# Patient Record
Sex: MALE | Race: AMERICAN INDIAN OR ALASKA NATIVE | Employment: UNEMPLOYED | URBAN - METROPOLITAN AREA
[De-identification: names, ages, dates, MRNs, and addresses within clinical notes are randomized per-mention and may not be internally consistent; named-entity substitution may affect disease eponyms.]

---

## 2018-08-30 ENCOUNTER — HOSPITAL ENCOUNTER (EMERGENCY)
Facility: CLINIC | Age: 28
Discharge: HOME | End: 2018-08-30
Attending: FAMILY MEDICINE | Admitting: FAMILY MEDICINE
Payer: COMMERCIAL

## 2018-08-30 VITALS
TEMPERATURE: 97.6 F | WEIGHT: 153 LBS | RESPIRATION RATE: 16 BRPM | DIASTOLIC BLOOD PRESSURE: 77 MMHG | OXYGEN SATURATION: 98 % | SYSTOLIC BLOOD PRESSURE: 125 MMHG

## 2018-08-30 DIAGNOSIS — K04.7 DENTAL ABSCESS: ICD-10-CM

## 2018-08-30 PROCEDURE — 99283 EMERGENCY DEPT VISIT LOW MDM: CPT

## 2018-08-30 PROCEDURE — 99284 EMERGENCY DEPT VISIT MOD MDM: CPT | Mod: Z6 | Performed by: FAMILY MEDICINE

## 2018-08-30 RX ORDER — BUPIVACAINE HYDROCHLORIDE 5 MG/ML
30 INJECTION, SOLUTION EPIDURAL; INTRACAUDAL ONCE
Status: DISCONTINUED | OUTPATIENT
Start: 2018-08-30 | End: 2018-08-30 | Stop reason: HOSPADM

## 2018-08-30 RX ORDER — PENICILLIN V POTASSIUM 500 MG/1
500 TABLET, FILM COATED ORAL 4 TIMES DAILY
Qty: 28 TABLET | Refills: 0 | Status: SHIPPED | OUTPATIENT
Start: 2018-08-30 | End: 2018-09-01

## 2018-08-30 RX ORDER — IBUPROFEN 800 MG/1
800 TABLET, FILM COATED ORAL EVERY 8 HOURS PRN
Qty: 20 TABLET | Refills: 0 | Status: SHIPPED | OUTPATIENT
Start: 2018-08-30 | End: 2018-09-01

## 2018-08-30 NOTE — ED PROVIDER NOTES
History     Chief Complaint   Patient presents with     Dental Pain     Has two broken teeth, right lower molars. One tooth down to the gum. Had bitten a sucker one week ago. Pain worse last two days.      HPI  Néstor Mckeon is a 27 year old male who presents to the Emergency Department for the evaluation of lower right dental pain. Patient states approximately one week ago he bit into something hard and broke a couple of his teeth (#29 and #30) and has had worsening pain since with increased sensitivity when eating and with hot and cold temperatures. Patient states he is otherwise healthy. He denies having an established dentist. He is requesting a dental block for pain.     I have reviewed the Medications, Allergies, Past Medical and Surgical History, and Social History in the [employer] system.  History reviewed. No pertinent past medical history.    History reviewed. No pertinent surgical history.    No family history on file.    Social History   Substance Use Topics     Smoking status: Not on file     Smokeless tobacco: Not on file     Alcohol use Not on file       Current Facility-Administered Medications   Medication     BUPivacaine (MARCAINE)  0.5% 30ml vial     Current Outpatient Prescriptions   Medication     ibuprofen (ADVIL/MOTRIN) 800 MG tablet     penicillin V potassium (VEETID) 500 MG tablet      No Known Allergies    Review of Systems  ROS: 14 point ROS neg other than the symptoms noted above in the HPI.    Physical Exam   BP: 126/87  Heart Rate: 73  Temp: 97.6  F (36.4  C)  Resp: 16  Weight: 69.4 kg (153 lb)  SpO2: 98 %      Physical Exam   Constitutional: He is oriented to person, place, and time. He appears well-developed and well-nourished.   HENT:   Head: Normocephalic and atraumatic.   Mouth/Throat: Uvula is midline and oropharynx is clear and moist. No trismus in the jaw. Abnormal dentition.   Patient has multiple carious appearing teeth and several teeth including 28 through 30 which are broken  off near the gum line.  No gum swelling or definite abscess, however he is very tender   Eyes: EOM are normal. Pupils are equal, round, and reactive to light.   Neck: Normal range of motion. Neck supple. No tracheal deviation present. No thyromegaly present.   Cardiovascular: Normal rate, regular rhythm, normal heart sounds and intact distal pulses.  Exam reveals no gallop and no friction rub.    No murmur heard.  Pulmonary/Chest: Effort normal and breath sounds normal. He exhibits no tenderness.   Abdominal: Soft. Bowel sounds are normal. He exhibits no distension and no mass. There is no tenderness.   Musculoskeletal: He exhibits no edema or tenderness.   The patient has obvious needle tracks in both biceps areas without any signs of abscess or cellulitis consistent with IV drug abuse   Neurological: He is alert and oriented to person, place, and time. No cranial nerve deficit. Coordination normal.   Skin: Skin is warm and dry. No rash noted.   Psychiatric: He has a normal mood and affect. His behavior is normal.   Nursing note and vitals reviewed.      ED Course   9:47 AM  The patient was seen and examined by Jeb Cardenas MD in Room 7.     ED Course     Procedures             Critical Care time:  none             Labs Ordered and Resulted from Time of ED Arrival Up to the Time of Departure from the ED - No data to display         Assessments & Plan (with Medical Decision Making)   Patient presented with right lower dental pain.  Initial DDX;  Dental caries, tooth fracture, periodontitis, there is no evidence of dental abscess, ludwigs angina or neck deep space infection.  Plan:  Dental block, antibiotics, pain control, close follow up with dentistry.  Patient was given a list of community dental resources.  Patient underwent a comprehensive evaluation in the ED which did not reveal any immediate life threats.  Patient improved with treatment offered in the emergency department.  Due to this improvement they were  discharged home.  They were instructed on close follow up with Dentistry ASAP and to return  to the ED if symptoms persisted, worsened or if they developed any new concerning symptoms or had any other concerns.  They verbalized understanding of the plan of care and felt comfortable with the plan.  In addition, we discussed the needle tracks on his arms.  Ultimately he admitted that he uses IV drugs including methamphetamine.  He is not interested in detox or treatment at this time, however I did provide him with resources for a rule 25 evaluation and also for [place] recovery services.    I have reviewed the nursing notes.    I have reviewed the findings, diagnosis, plan and need for follow up with the patient.    New Prescriptions    IBUPROFEN (ADVIL/MOTRIN) 800 MG TABLET    Take 1 tablet (800 mg) by mouth every 8 hours as needed for moderate pain    PENICILLIN V POTASSIUM (VEETID) 500 MG TABLET    Take 1 tablet (500 mg) by mouth 4 times daily for 7 days       Final diagnoses:   Dental abscess     IAbram, am serving as a trained medical scribe to document services personally performed by Jeb Cardenas MD, based on the provider's statements to me.   IJeb MD, was physically present and have reviewed and verified the accuracy of this note documented by Abram Villela.    8/30/2018   [facility], EMERGENCY DEPARTMENT     Jin Cardenas MD  08/30/18 5230

## 2018-08-30 NOTE — DISCHARGE INSTRUCTIONS
Thank you for choosing [facility].     Please closely monitor for further symptoms. Return to the Emergency Department if you develop any new or worsening signs or symptoms.    If you received any opiate pain medications or sedatives during your visit, please do not drive for at least 8 hours.     Labs, cultures or final xray interpretations may still need to be reviewed.  We will call you if your plan of care needs to be changed.    Please make an appointment to follow up with [facility], [phone] or [facility] (phone: [phone]) as soon as possible.  Should you have difficulty accessing care due to insurance concerns, please see list below.  Many of these clinics offer a sliding fee option for patients that qualify, and see patients on a walk-in or same day basis. Please call each clinic directly. As services, hours, fees and policies vary greatly.    [place]:  [facility]     [phone]        [facility] ([facility]) [phone]  [facility]  [phone]  [facility]      [phone]   Community [facility]    [phone]  [facility]  [phone]  [facility]) [phone]  [facility]     [phone]  [facility]   [phone]  [facility] (cash only)   [phone]  [facility]    [phone] (adults)          [phone] (children)    [place]:  [facility]     [phone]; [phone]  [facility]     [phone]  [facility]     [phone]  [facility] (free, limited)    [phone]    Multiple Locations:  [facility]       [phone]        Dental Abscess    An abscess is a pocket of pus at the tip of a tooth root in your jaw bone. It is caused  "by an infection at the root of the tooth. It can cause pain and swelling of the gum, cheek, or jaw. Pain may spread from the tooth to your ear or the area of your jaw on the same side. If the abscess isn t treated, it appears as a bubble or swelling on the gum near the tooth. The pressure that builds in this swelling is the source of the pain. More serious infections cause your face to swell.  An abscess can be caused by a crack in the tooth, a cavity, a gum infection, or a combination of these. Once the pulp of the tooth is exposed, bacteria can spread down the roots to the tip. If the bacteria are not stopped, they can damage the bone and soft tissue, and an abscess can form.  Home care  Follow these guidelines when caring for yourself at home:    Don't have hot and cold foods and drinks. Your tooth may be sensitive to changes in temperature. Don t chew on the side of the infected tooth.    If your tooth is chipped or cracked, or if there is a large open cavity, put oil of cloves directly on the tooth to relieve pain. You can buy oil of cloves at [facility]. Some pharmacies carry an over-the-counter \"toothache kit.\" This contains a paste that you can put on the exposed tooth to make it less sensitive.    Put a cold pack on your jaw over the sore area to help reduce pain.    You may use over-the-counter medicine to ease pain, unless another medicine was prescribed. If you have chronic liver or kidney disease, talk with your healthcare provider before using acetaminophen or ibuprofen. Also talk with your provider if you ve had a stomach ulcer or GI bleeding.    An antibiotic will be prescribed. Take it until finished, even if you are feeling better after a few days.  Follow-up care  Follow up with your dentist or an oral surgeon, or as advised. Once an infection occurs in a tooth, it will continue to be a problem until the infection is drained. This is done through surgery or a root canal. Or you may need to have " your tooth pulled.  Call 911  Call 911 if any of these occur:    Unusual drowsiness    Headache or stiff neck    Weakness or fainting    Difficulty swallowing, breathing, or opening your mouth    Swollen eyelids  When to seek medical advice  Call your healthcare provider right away if any of these occur:    Your face becomes more swollen or red    Pain gets worse or spreads to your neck    Fever of 100.4  F (38.0  C) or higher, or as directed by your healthcare provider    Pus drains from the tooth  Date Last Reviewed: 10/1/2016    6534-9931 [employer]. [address] [place] [address]. All rights reserved. This information is not intended as a substitute for professional medical care. Always follow your healthcare professional's instructions.        If you desire chemical dependency assessment or counseling, follow up with [facility]: [phone]

## 2018-08-30 NOTE — ED AVS SNAPSHOT
[facility], [place], Emergency Department    [address]    [place] [address]    Phone:  [phone]    Fax:  [phone]                                       Néstor Mckeon   MRN: [id number]    Department:  [facility], Emergency Department   Date of Visit:  8/30/2018           After Visit Summary Signature Page     I have received my discharge instructions, and my questions have been answered. I have discussed any challenges I see with this plan with the nurse or doctor.    ..........................................................................................................................................  Patient/Patient Representative Signature      ..........................................................................................................................................  Patient Representative Print Name and Relationship to Patient    ..................................................               ................................................  Date                                            Time    ..........................................................................................................................................  Reviewed by Signature/Title    ...................................................              ..............................................  Date                                                            Time          22EPIC Rev 08/18

## 2018-08-30 NOTE — ED AVS SNAPSHOT
[facility], [place], Emergency Department    [address]    [place] [address]    Phone:  [phone]    Fax:  [phone]                                       Néstor Mckeon   MRN: [id number]    Department:  [facility], [place], Emergency Department   Date of Visit:  8/30/2018           Patient Information     Date Of Birth          1990        Your diagnoses for this visit were:     Dental abscess        You were seen by Jin Cardenas MD.        Discharge Instructions       Thank you for choosing [facility].     Please closely monitor for further symptoms. Return to the Emergency Department if you develop any new or worsening signs or symptoms.    If you received any opiate pain medications or sedatives during your visit, please do not drive for at least 8 hours.     Labs, cultures or final xray interpretations may still need to be reviewed.  We will call you if your plan of care needs to be changed.    Please make an appointment to follow up with [facility], [phone] or [facility] (phone: [phone]) as soon as possible.  Should you have difficulty accessing care due to insurance concerns, please see list below.  Many of these clinics offer a sliding fee option for patients that qualify, and see patients on a walk-in or same day basis. Please call each clinic directly. As services, hours, fees and policies vary greatly.    [place]:  [facility]     [phone]        [facility] ([facility]) [phone]  [facility]  [phone]  [facility]      [phone]   Community Clinic    [phone]  [facility]  [phone]  [facility]) [phone]  [facility]     [phone]  [facility]   [phone]  [facility]  "only)   [phone]  [facility]    [phone] (adults)          [phone] (children)    [place]:  [facility]     [phone]; [phone]  [facility]     [phone]  [facility]     [phone]  [facility] (free, limited)    [phone]    Multiple Locations:  [facility]       [phone]        Dental Abscess    An abscess is a pocket of pus at the tip of a tooth root in your jaw bone. It is caused by an infection at the root of the tooth. It can cause pain and swelling of the gum, cheek, or jaw. Pain may spread from the tooth to your ear or the area of your jaw on the same side. If the abscess isn t treated, it appears as a bubble or swelling on the gum near the tooth. The pressure that builds in this swelling is the source of the pain. More serious infections cause your face to swell.  An abscess can be caused by a crack in the tooth, a cavity, a gum infection, or a combination of these. Once the pulp of the tooth is exposed, bacteria can spread down the roots to the tip. If the bacteria are not stopped, they can damage the bone and soft tissue, and an abscess can form.  Home care  Follow these guidelines when caring for yourself at home:    Don't have hot and cold foods and drinks. Your tooth may be sensitive to changes in temperature. Don t chew on the side of the infected tooth.    If your tooth is chipped or cracked, or if there is a large open cavity, put oil of cloves directly on the tooth to relieve pain. You can buy oil of cloves at drugstores. Some pharmacies carry an over-the-counter \"toothache kit.\" This contains a paste that you can put on the exposed tooth to make it less sensitive.    Put a cold pack on your jaw over the sore area to help reduce pain.    You may use over-the-counter medicine to ease pain, unless another medicine was prescribed. If you have chronic liver or kidney disease, talk with your " healthcare provider before using acetaminophen or ibuprofen. Also talk with your provider if you ve had a stomach ulcer or GI bleeding.    An antibiotic will be prescribed. Take it until finished, even if you are feeling better after a few days.  Follow-up care  Follow up with your dentist or an oral surgeon, or as advised. Once an infection occurs in a tooth, it will continue to be a problem until the infection is drained. This is done through surgery or a root canal. Or you may need to have your tooth pulled.  Call 911  Call 911 if any of these occur:    Unusual drowsiness    Headache or stiff neck    Weakness or fainting    Difficulty swallowing, breathing, or opening your mouth    Swollen eyelids  When to seek medical advice  Call your healthcare provider right away if any of these occur:    Your face becomes more swollen or red    Pain gets worse or spreads to your neck    Fever of 100.4  F (38.0  C) or higher, or as directed by your healthcare provider    Pus drains from the tooth  Date Last Reviewed: 10/1/2016    5035-1122 [employer]. [address] [place] [address]. All rights reserved. This information is not intended as a substitute for professional medical care. Always follow your healthcare professional's instructions.        If you desire chemical dependency assessment or counseling, follow up with [facility]: [phone]      24 Hour Appointment Hotline       To make an appointment at any [facility] clinic, call [phone]). If you don't have a family doctor or clinic, we will help you find one. [facility] clinics are conveniently located to serve the needs of you and your family.             Review of your medicines      START taking        Dose / Directions Last dose taken    ibuprofen 800 MG tablet   Commonly known as:  ADVIL/MOTRIN   Dose:  800 mg   Quantity:  20 tablet        Take 1 tablet (800 mg) by mouth every 8 hours as needed for  "moderate pain   Refills:  0        penicillin V potassium 500 MG tablet   Commonly known as:  VEETID   Dose:  500 mg   Quantity:  28 tablet        Take 1 tablet (500 mg) by mouth 4 times daily for 7 days   Refills:  0                Prescriptions were sent or printed at these locations (2 Prescriptions)                   Other Prescriptions                Printed at Department/Unit printer (2 of 2)         ibuprofen (ADVIL/MOTRIN) 800 MG tablet               penicillin V potassium (VEETID) 500 MG tablet                Orders Needing Specimen Collection     None      Pending Results     No orders found from 8/28/2018 to 8/31/2018.            Pending Culture Results     No orders found from 8/28/2018 to 8/31/2018.            Pending Results Instructions     If you had any lab results that were not finalized at the time of your Discharge, you can call the ED Lab Result RN at [phone]. You will be contacted by this team for any positive Lab results or changes in treatment. The nurses are available 7 days a week from 10A to 6:30P.  You can leave a message 24 hours per day and they will return your call.        Thank you for choosing [facility]       Thank you for choosing [facility] for your care. Our goal is always to provide you with excellent care. Hearing back from our patients is one way we can continue to improve our services. Please take a few minutes to complete the written survey that you may receive in the mail after you visit with us. Thank you!        [employer] Information     [employer] lets you send messages to your doctor, view your test results, renew your prescriptions, schedule appointments and more. To sign up, go to [email or web address] . Click on \"Log in\" on the left side of the screen, which will take you to the Welcome page. Then click on \"Sign up Now\" on the right side of the page.     You will be asked to enter the access code listed below, as well as some personal information. Please follow the " directions to create your username and password.     Your access code is: [email or web address]  Expires: 2018 10:02 AM     Your access code will  in 90 days. If you need help or a new code, please call your [facility] clinic or [phone].        Care EveryWhere ID     This is your Care EveryWhere ID. This could be used by other organizations to access your [facility] medical records  [id number]        Equal Access to Services     HEATHER BEST : Hadii elizabeth mckenzie hadasho Soomaali, waaxda luqadaha, qaybta kaalmada adeegyada, waxmartha worthington . So [facility] [phone].    ATENCIÓN: Si habla español, tiene a harris disposición servicios gratuitos de asistencia lingüística. Llame al [phone].    We comply with applicable federal civil rights laws and [place] laws. We do not discriminate on the basis of race, color, national origin, age, disability, sex, sexual orientation, or gender identity.            After Visit Summary       This is your record. Keep this with you and show to your community pharmacist(s) and doctor(s) at your next visit.

## 2018-09-01 ENCOUNTER — HOSPITAL ENCOUNTER (EMERGENCY)
Facility: CLINIC | Age: 28
Discharge: HOME | End: 2018-09-01
Attending: EMERGENCY MEDICINE | Admitting: EMERGENCY MEDICINE
Payer: COMMERCIAL

## 2018-09-01 VITALS
SYSTOLIC BLOOD PRESSURE: 108 MMHG | RESPIRATION RATE: 16 BRPM | TEMPERATURE: 98.9 F | DIASTOLIC BLOOD PRESSURE: 67 MMHG | OXYGEN SATURATION: 100 %

## 2018-09-01 DIAGNOSIS — M62.81 GENERALIZED MUSCLE WEAKNESS: ICD-10-CM

## 2018-09-01 PROCEDURE — 25000132 ZZH RX MED GY IP 250 OP 250 PS 637: Performed by: EMERGENCY MEDICINE

## 2018-09-01 PROCEDURE — 25000125 ZZHC RX 250: Performed by: EMERGENCY MEDICINE

## 2018-09-01 PROCEDURE — 99283 EMERGENCY DEPT VISIT LOW MDM: CPT

## 2018-09-01 RX ORDER — IBUPROFEN 600 MG/1
600 TABLET, FILM COATED ORAL ONCE
Status: COMPLETED | OUTPATIENT
Start: 2018-09-01 | End: 2018-09-01

## 2018-09-01 RX ORDER — ACETAMINOPHEN 500 MG
500 TABLET ORAL ONCE
Status: COMPLETED | OUTPATIENT
Start: 2018-09-01 | End: 2018-09-01

## 2018-09-01 RX ORDER — ONDANSETRON 4 MG/1
4 TABLET, ORALLY DISINTEGRATING ORAL ONCE
Status: COMPLETED | OUTPATIENT
Start: 2018-09-01 | End: 2018-09-01

## 2018-09-01 RX ADMIN — ONDANSETRON 4 MG: 4 TABLET, ORALLY DISINTEGRATING ORAL at 13:12

## 2018-09-01 RX ADMIN — IBUPROFEN 600 MG: 600 TABLET, FILM COATED ORAL at 13:12

## 2018-09-01 RX ADMIN — ACETAMINOPHEN 500 MG: 500 TABLET, FILM COATED ORAL at 13:12

## 2018-09-01 ASSESSMENT — ENCOUNTER SYMPTOMS
NAUSEA: 1
WEAKNESS: 1
HEADACHES: 1
NUMBNESS: 0
VOMITING: 0
FATIGUE: 1
EYES NEGATIVE: 1
ABDOMINAL PAIN: 0

## 2018-09-01 NOTE — DISCHARGE INSTRUCTIONS
Weakness with Uncertain Cause  Based on your exam today, the exact cause of your weakness is not certain. But your weakness does not seem to be a sign of a serious illness at this time. Keep an eye on your symptoms and get medical advice as instructed below.  Home care    Rest at home today. Don't over-exert yourself.    Take any medicine as prescribed.    For the next few days, drink extra fluids (unless your healthcare provider wants you to restrict fluids for other reasons). Don't skip meals.    Unless otherwise directed, continue to take any prescription medicines.    Contact your healthcare provider if you have any questions or concerns.  Follow-up care  Follow up with your healthcare provider, or as advised.  When to seek medical advice  Call your healthcare provider right away for any of the following:    Symptoms get worse    Symptoms don't start getting better within 2 days    Fever of 100.4  F (38  C) or higher, or as directed by your healthcare provider  Call 911  Call 911 for any of these:    Chest, arm, neck, jaw, or upper back pain    Trouble breathing    Numbness or weakness of the face, one arm, or one leg    Slurred speech, confusion, or trouble speaking, walking, or seeing    Blood in vomit or stool (black or red color)    Loss of consciousness    Severe headache  Date Last Reviewed: 10/1/2017    8747-9965 [employer]. [address] [place] [address], [place] [address]. All rights reserved. This information is not intended as a substitute for professional medical care. Always follow your healthcare professional's instructions.

## 2018-09-01 NOTE — ED PROVIDER NOTES
History     Chief Complaint:  generalized weakness     HPI   Poor historian.  Néstor Mckeon is a 27 year old male who presents via EMS for evaluation of generalized weakness. The patient uses heroin frequently, with last use of IV heroin yesterday. Today he was apprehended by Police after stealing at [employer]. He complained of generalized weakness, diffuse headache, and nausea with perhaps 3 episodes of non-bloody emesis to them, and so was transported here. No focal numbness or weakness, visual deficit, abdominal pain, chest pain, dyspnea, suicidal or homicidal ideation, or any other acute symptoms.       Allergies:  No Known Allergies     Medications:    The patient is not currently taking any prescribed medications.      Past Medical History:    Heroin abuse    Past Surgical History:    History reviewed. No pertinent surgical history.     Family History:    History reviewed. No pertinent family history.      Social History:  Uses IV heroin  Marital Status:  Single [1]       Review of Systems   Constitutional: Positive for fatigue.   Eyes: Negative.    Gastrointestinal: Positive for nausea. Negative for abdominal pain and vomiting.   Neurological: Positive for weakness and headaches. Negative for numbness.   All other systems reviewed and are negative.      Physical Exam     Patient Vitals for the past 24 hrs:   BP Temp Temp src Heart Rate Resp SpO2   09/01/18 1302 108/67 98.9  F (37.2  C) Oral 68 16 100 %        Physical Exam  Vitals: reviewed by me  General: Pt seen on [facility], cooperative, and alert to conversation  Eyes: Tracking well, clear conjunctiva BL  ENT: MMM, midline trachea.   Lungs:   No tachypnea, no accessory muscle use. No respiratory distress.   CV: Rate as above, regular rhythm.    Abd: Soft, non tender, no guarding, no rebound. Non distended  MSK: no peripheral edema or joint effusion.  No evidence of trauma  Skin: No rash, normal turgor and temperature  Neuro: Clear  speech and no facial droop.  Psych: Not RIS, no e/o AH/VH      Emergency Department Course   Interventions:  ibuprofen 600mg, PO  acetaminophen 500mg, PO  ondansetron (Zofran) 4mg, ODT     Emergency Department Course:  Past medical records, nursing notes, and vitals reviewed.   1305: I performed an exam of the patient as documented above.    The patient was given the above interventions with improvement.  The patient tolerated PO here without difficulty.      Impression & Plan    Medical Decision Making:  Néstor Mckeon is a 27 year old male who presents the emergency room after being arrested for shoplifting at a local [employer] store.  Patient states that he felt quite fatigued and nauseous, but also states he is hungry.  He has a benign abdominal exam, he has tolerated an entire tray of food and passed his p.o. trial here.  On my reassessment, he states he has no pain after eating, and is asking to go home.  Patient denies fevers, I see no evidence of illness related to his IV drug abuse, he states that he does plan on using heroin again tonight, he does not want counseling or rehab.  OK for dc as above, discussed clear return to ED precautions.      Diagnosis:    ICD-10-CM    1. Generalized muscle weakness M62.81     resolved       Disposition:   discharged to home    Scribe Disclosure:  I, [place], am serving as a scribe at 1:03 PM on 9/1/2018 to document services personally performed by Toni Dudley MD based on my observations and the provider's statements to me.    [place]  9/1/2018   EMERGENCY DEPARTMENT      Toni Dudley MD  09/01/18 1291

## 2018-09-01 NOTE — ED NOTES
Bed: ED26  Expected date:   Expected time:   Means of arrival:   Comments:  M[address] Lethargic 27 male

## 2019-06-18 ENCOUNTER — HOSPITAL ENCOUNTER (EMERGENCY)
Facility: CLINIC | Age: 29
Discharge: HOME | End: 2019-06-18
Attending: EMERGENCY MEDICINE | Admitting: EMERGENCY MEDICINE
Payer: COMMERCIAL

## 2019-06-18 VITALS
RESPIRATION RATE: 16 BRPM | TEMPERATURE: 97.7 F | OXYGEN SATURATION: 97 % | DIASTOLIC BLOOD PRESSURE: 76 MMHG | HEART RATE: 88 BPM | WEIGHT: 163.6 LBS | SYSTOLIC BLOOD PRESSURE: 118 MMHG

## 2019-06-18 DIAGNOSIS — H92.02 LEFT EAR PAIN: ICD-10-CM

## 2019-06-18 DIAGNOSIS — G44.209 TENSION HEADACHE: ICD-10-CM

## 2019-06-18 PROCEDURE — 99282 EMERGENCY DEPT VISIT SF MDM: CPT | Performed by: EMERGENCY MEDICINE

## 2019-06-18 PROCEDURE — 99284 EMERGENCY DEPT VISIT MOD MDM: CPT | Mod: Z6 | Performed by: EMERGENCY MEDICINE

## 2019-06-18 RX ORDER — IBUPROFEN 600 MG/1
600 TABLET, FILM COATED ORAL ONCE
Status: DISCONTINUED | OUTPATIENT
Start: 2019-06-18 | End: 2019-06-18 | Stop reason: HOSPADM

## 2019-06-18 RX ORDER — ACETAMINOPHEN 500 MG
1000 TABLET ORAL ONCE
Status: DISCONTINUED | OUTPATIENT
Start: 2019-06-18 | End: 2019-06-18 | Stop reason: HOSPADM

## 2019-06-18 RX ORDER — IBUPROFEN 200 MG
400 TABLET ORAL EVERY 8 HOURS PRN
Qty: 60 TABLET | Refills: 0 | Status: SHIPPED | OUTPATIENT
Start: 2019-06-18

## 2019-06-18 RX ORDER — ACETAMINOPHEN 325 MG/1
325-650 TABLET ORAL EVERY 6 HOURS PRN
Qty: 30 TABLET | Refills: 0 | Status: SHIPPED | OUTPATIENT
Start: 2019-06-18

## 2019-06-18 ASSESSMENT — ENCOUNTER SYMPTOMS
TROUBLE SWALLOWING: 0
HEADACHES: 1
NUMBNESS: 0
NECK STIFFNESS: 0
SEIZURES: 0
SINUS PAIN: 0
NECK PAIN: 0
FEVER: 0
SINUS PRESSURE: 0
CHILLS: 0
BACK PAIN: 0

## 2019-06-18 NOTE — ED PROVIDER NOTES
History     Chief Complaint   Patient presents with     Headache     Headache for 2 days, associated with intermittent blurry vision      HPI  Néstor Mckeon is a 28 year old male who has a history of polysubstance abuse presenting with ear pain.  He says he has had ear pain on and off for several years that gets worse in the cold weather.  Denies loss of hearing, change in vision, fevers, chills, neck pain or stiffness.  May be associated with chewing.  He also has headaches, today this is typical headache that have also been for several years but may be worse over the past 2 days.  Headache is gradual onset, not sudden onset.  Says headache is gone away now.  The blurry vision he complained about was he noticed several months ago that when he walks from a light outside environment into the dark room his vision needs time to adjust.  Denies symptoms at this point.  Otherwise has not had nausea, vomiting, diarrhea.  Did use meth yesterday, denies active suicidal homicidal ideation.  Does not have primary care provider.    I have reviewed the Medications, Allergies, Past Medical and Surgical History, and Social History in the Epic system.    Review of Systems   Constitutional: Negative for chills and fever.   HENT: Positive for ear pain (L, several years on and off). Negative for sinus pressure, sinus pain and trouble swallowing.    Musculoskeletal: Negative for back pain, neck pain and neck stiffness.   Skin: Negative for rash.   Neurological: Positive for headaches (none now, similar to prior). Negative for seizures, syncope and numbness.   Psychiatric/Behavioral: Negative for self-injury and suicidal ideas.       Physical Exam   BP: 128/88  Heart Rate: 85  Temp: 97.7  F (36.5  C)  Resp: 16  Weight: 74.2 kg (163 lb 9.6 oz)  SpO2: 99 %      Physical Exam  Physical Exam   Constitutional: oriented to person, place, and time. appears well-developed and well-nourished.   HENT:   Head: Normocephalic and atraumatic.  Bilateral tympanic membranes without evidence of swelling, erythema, bulging.  No tenderness palpation of the temple area.  No tenderness palpation over the TMJ.  Neck: Normal range of motion. Supple neck.  Pulmonary/Chest: Effort normal. No respiratory distress.   Cardiac: No murmurs, rubs, gallops. RRR.  Abdominal: Abdomen soft, nontender, nondistended. No rebound tenderness.  MSK: Long bones without deformity or evidence of trauma  Neurological: alert and oriented to person, place, and time.   Skin: Skin is warm and dry.   Psychiatric:  normal mood and affect.  behavior is normal. Thought content normal.     ED Course        Procedures          Labs Ordered and Resulted from Time of ED Arrival Up to the Time of Departure from the ED - No data to display         Assessments & Plan (with Medical Decision Making)   MDM  Patient presenting with ear pain, headache that has been going on for several years.  This is not sudden in onset, very unlikely intracranial hemorrhage, subarachnoid hemorrhage, he has had no trauma, does not meet criteria to get a CT scan.  Unlikely meningitis or encephalitis due to lack of neck pain, stiffness, fevers or other infectious symptoms.  The patient is nontoxic and appears well.  Somatic membranes normal without evidence of infection, and this may be more related to TMJ pain due to pain with eating and chronic in nature.  He has no Tylenol or ibuprofen at home, he will be discharged with these medications.  We will also give him phone number to contact primary care provider.  Patient agrees with this plan.  I do suspect homelessness placed into visit tonight, states he has no active say, offered shelter information in addition a bus token, recently was released from alf.    I have reviewed the nursing notes.    I have reviewed the findings, diagnosis, plan and need for follow up with the patient.       Medication List      Started    acetaminophen 325 MG tablet  Commonly known as:   TYLENOL  325-650 mg, Oral, EVERY 6 HOURS PRN     ibuprofen 200 MG tablet  Commonly known as:  ADVIL/MOTRIN  400 mg, Oral, EVERY 8 HOURS PRN            Final diagnoses:   Left ear pain   Tension headache       6/18/2019   [facility], [place], EMERGENCY DEPARTMENT     Abram Mccullough MD  06/18/19 0216

## 2019-06-18 NOTE — DISCHARGE INSTRUCTIONS
Please make an appointment to [facility] with [facility] (phone: [phone] in 3-5 days even if entirely better.    If Néstor has discomfort from fever or other pain, he can have:  Acetaminophen (Tylenol) every 6 hours as needed. His dose is:    2 regular strength tabs (650 mg)                                     (43.2+ kg/96+ lb)    NOTE: If your acetaminophen (Tylenol) came with a dropper marked with 0.4 and 0.8 ml, call us ([phone]) or check with your doctor about the dose before using it.     AND/OR      Ibuprofen (Advil, Motrin) every 6 hours as needed. His/her dose is:    1 tab of the 400 mg prescription tabs                                                                  (40-60 kg/ lb)

## 2019-06-18 NOTE — ED AVS SNAPSHOT
[facility], [place], Emergency Department  [address]  [place] [address]  Phone:  [phone]  Fax:  [phone]                                    Néstor Mckeon   MRN: [id number]    Department:  [facility], Emergency Department   Date of Visit:  6/18/2019           After Visit Summary Signature Page    I have received my discharge instructions, and my questions have been answered. I have discussed any challenges I see with this plan with the nurse or doctor.    ..........................................................................................................................................  Patient/Patient Representative Signature      ..........................................................................................................................................  Patient Representative Print Name and Relationship to Patient    ..................................................               ................................................  Date                                   Time    ..........................................................................................................................................  Reviewed by Signature/Title    ...................................................              ..............................................  Date                                               Time          22EPIC Rev 08/18